# Patient Record
Sex: MALE | Race: WHITE | NOT HISPANIC OR LATINO | Employment: UNEMPLOYED | URBAN - METROPOLITAN AREA
[De-identification: names, ages, dates, MRNs, and addresses within clinical notes are randomized per-mention and may not be internally consistent; named-entity substitution may affect disease eponyms.]

---

## 2017-08-16 DIAGNOSIS — F41.8 DEPRESSION WITH ANXIETY: Primary | ICD-10-CM

## 2017-08-16 DIAGNOSIS — G40.909 EPILEPSY: ICD-10-CM

## 2017-11-24 ENCOUNTER — HOSPITAL ENCOUNTER (EMERGENCY)
Facility: HOSPITAL | Age: 52
Discharge: HOME | End: 2017-11-25
Attending: EMERGENCY MEDICINE
Payer: MEDICAID

## 2017-11-24 DIAGNOSIS — F10.920 ALCOHOLIC INTOXICATION WITHOUT COMPLICATION: Primary | ICD-10-CM

## 2017-11-24 DIAGNOSIS — F19.10 POLYSUBSTANCE ABUSE: ICD-10-CM

## 2017-11-24 LAB
ANISOCYTOSIS BLD QL SMEAR: SLIGHT
BASOPHILS # BLD AUTO: 0.05 K/UL
BASOPHILS NFR BLD: 0.6 %
BILIRUB UR QL STRIP: NEGATIVE
CLARITY UR: CLEAR
COLOR UR: YELLOW
DACRYOCYTES BLD QL SMEAR: ABNORMAL
DIFFERENTIAL METHOD: ABNORMAL
EOSINOPHIL # BLD AUTO: 0.1 K/UL
EOSINOPHIL NFR BLD: 0.8 %
ERYTHROCYTE [DISTWIDTH] IN BLOOD BY AUTOMATED COUNT: 15.6 %
GLUCOSE UR QL STRIP: NEGATIVE
HCT VFR BLD AUTO: 40.6 %
HGB BLD-MCNC: 13.4 G/DL
HGB UR QL STRIP: NEGATIVE
HYPOCHROMIA BLD QL SMEAR: ABNORMAL
KETONES UR QL STRIP: NEGATIVE
LEUKOCYTE ESTERASE UR QL STRIP: NEGATIVE
LYMPHOCYTES # BLD AUTO: 3.5 K/UL
LYMPHOCYTES NFR BLD: 44.1 %
MCH RBC QN AUTO: 22.1 PG
MCHC RBC AUTO-ENTMCNC: 33 G/DL
MCV RBC AUTO: 67 FL
MONOCYTES # BLD AUTO: 0.7 K/UL
MONOCYTES NFR BLD: 9 %
NEUTROPHILS # BLD AUTO: 3.6 K/UL
NEUTROPHILS NFR BLD: 45.5 %
NITRITE UR QL STRIP: NEGATIVE
PH UR STRIP: 6 [PH] (ref 5–8)
PLATELET # BLD AUTO: 147 K/UL
PLATELET BLD QL SMEAR: ABNORMAL
PMV BLD AUTO: 10.2 FL
POIKILOCYTOSIS BLD QL SMEAR: SLIGHT
POLYCHROMASIA BLD QL SMEAR: ABNORMAL
PROT UR QL STRIP: NEGATIVE
RBC # BLD AUTO: 6.07 M/UL
SP GR UR STRIP: <=1.005 (ref 1–1.03)
TARGETS BLD QL SMEAR: ABNORMAL
URN SPEC COLLECT METH UR: ABNORMAL
UROBILINOGEN UR STRIP-ACNC: NEGATIVE EU/DL
WBC # BLD AUTO: 7.93 K/UL

## 2017-11-24 PROCEDURE — 93010 ELECTROCARDIOGRAM REPORT: CPT | Mod: ,,, | Performed by: INTERNAL MEDICINE

## 2017-11-24 PROCEDURE — 80307 DRUG TEST PRSMV CHEM ANLYZR: CPT

## 2017-11-24 PROCEDURE — 80053 COMPREHEN METABOLIC PANEL: CPT

## 2017-11-24 PROCEDURE — 99284 EMERGENCY DEPT VISIT MOD MDM: CPT

## 2017-11-24 PROCEDURE — 93005 ELECTROCARDIOGRAM TRACING: CPT

## 2017-11-24 PROCEDURE — 81003 URINALYSIS AUTO W/O SCOPE: CPT

## 2017-11-24 PROCEDURE — 80320 DRUG SCREEN QUANTALCOHOLS: CPT

## 2017-11-24 PROCEDURE — 85025 COMPLETE CBC W/AUTO DIFF WBC: CPT

## 2017-11-25 VITALS
TEMPERATURE: 98 F | BODY MASS INDEX: 26.79 KG/M2 | WEIGHT: 220 LBS | DIASTOLIC BLOOD PRESSURE: 74 MMHG | HEIGHT: 76 IN | HEART RATE: 85 BPM | OXYGEN SATURATION: 98 % | RESPIRATION RATE: 18 BRPM | SYSTOLIC BLOOD PRESSURE: 119 MMHG

## 2017-11-25 LAB
ALBUMIN SERPL BCP-MCNC: 4.3 G/DL
ALP SERPL-CCNC: 63 U/L
ALT SERPL W/O P-5'-P-CCNC: 92 U/L
AMPHET+METHAMPHET UR QL: NEGATIVE
ANION GAP SERPL CALC-SCNC: 14 MMOL/L
AST SERPL-CCNC: 101 U/L
BARBITURATES UR QL SCN>200 NG/ML: NEGATIVE
BENZODIAZ UR QL SCN>200 NG/ML: NEGATIVE
BILIRUB SERPL-MCNC: 0.6 MG/DL
BUN SERPL-MCNC: 6 MG/DL
BZE UR QL SCN: ABNORMAL
CALCIUM SERPL-MCNC: 8.9 MG/DL
CANNABINOIDS UR QL SCN: NEGATIVE
CHLORIDE SERPL-SCNC: 107 MMOL/L
CO2 SERPL-SCNC: 23 MMOL/L
CREAT SERPL-MCNC: 0.8 MG/DL
CREAT UR-MCNC: 11.8 MG/DL
EST. GFR  (AFRICAN AMERICAN): >60 ML/MIN/1.73 M^2
EST. GFR  (NON AFRICAN AMERICAN): >60 ML/MIN/1.73 M^2
ETHANOL SERPL-MCNC: 420 MG/DL
GLUCOSE SERPL-MCNC: 96 MG/DL
METHADONE UR QL SCN>300 NG/ML: NEGATIVE
OPIATES UR QL SCN: NEGATIVE
PCP UR QL SCN>25 NG/ML: NEGATIVE
POTASSIUM SERPL-SCNC: 3.8 MMOL/L
PROT SERPL-MCNC: 7.4 G/DL
SODIUM SERPL-SCNC: 144 MMOL/L
TOXICOLOGY INFORMATION: ABNORMAL

## 2017-11-25 NOTE — ED NOTES
Pt asked why he was not allowed to go help his friends clean. Reoriented pt about why he is in the hospital and why he has pulse oximetry hooked onto his finger.

## 2017-11-25 NOTE — ED NOTES
APPEARANCE: Alert, oriented and in no acute distress. Pt slurring his words.   CARDIAC: Normal rate and rhythm. S1S2 noted.   PERIPHERAL VASCULAR: peripheral pulses present. Normal cap refill. No edema. Warm to touch.  RESPIRATORY:Normal rate and effort, breath sounds clear bilaterally throughout chest. Respirations are equal and unlabored no obvious signs of distress.  GASTRO: soft, bowel sounds normal, no tenderness, no abdominal distention.  MUSC: Full ROM. No bony tenderness or soft tissue tenderness. No obvious deformity. Pt has an unsteady gait when walking.   SKIN: Skin is warm and dry, normal skin turgor, mucous membranes moist.  NEURO: 5/5 strength major flexors/extensors bilaterally. Sensory intact to light touch bilaterally. Jose Alejandro coma scale: eyes open spontaneously-4, oriented & converses-5, obeys commands-6. No neurological abnormalities.   MENTAL STATUS: awake, alert and aware of environment.  EYE: PERRL, both eyes: pupils brisk and reactive to light. Normal size.  ENT: EARS: no obvious drainage. NOSE: no active bleeding.

## 2017-11-25 NOTE — ED NOTES
Pt no longer slurring. Was able to walk to the bathroom and back. Pt was feeling dizzy, but he was able to keep his balance, overall, but not perfectly. Pt given water and a turkey sandwich.

## 2017-11-25 NOTE — ED PROVIDER NOTES
"Encounter Date: 11/24/2017    SCRIBE #1 NOTE: I, Carl Hoffman, am scribing for, and in the presence of,  Owen Gurrola MD. I have scribed the entire note.       History     Chief Complaint   Patient presents with    Alcohol Intoxication     ems responded to 911 call at a bar for public intoxication. pt. was unable to ambulate secondary to intoxication. pt. mildly belligerent.  [id number] per ems     The patient is a 52 y.o. male presents to the ED with a complaint of alcohol intoxication. He was collected by EMS after police were called about public intoxication complaint. The patient has no specific complaints other than wanting to know who "urinated on his pants". Further history limited secondary to intoxication.       The history is provided by the EMS personnel. The history is limited by the condition of the patient.     Review of patient's allergies indicates:  No Known Allergies  History reviewed. No pertinent past medical history.  History reviewed. No pertinent surgical history.  History reviewed. No pertinent family history.  Social History   Substance Use Topics    Smoking status: Never Smoker    Smokeless tobacco: Current User     Types: Chew    Alcohol use 0.0 oz/week     Review of Systems   Unable to perform ROS: Mental status change       Physical Exam     Initial Vitals [11/24/17 2227]   BP Pulse Resp Temp SpO2   120/78 81 18 98.2 °F (36.8 °C) 97 %      MAP       92         Physical Exam    Nursing note and vitals reviewed.  Constitutional: He appears well-developed and well-nourished. No distress.   Disheveled, smells of alcohol   HENT:   Head: Normocephalic and atraumatic.   Oropharynx clear; Dry MM   Eyes: Conjunctivae and EOM are normal. Pupils are equal, round, and reactive to light.   Neck: Normal range of motion. Neck supple. No tracheal deviation present.   Cardiovascular: Normal rate, regular rhythm, normal heart sounds and intact distal pulses. Exam reveals no gallop and no friction " rub.    No murmur heard.  Pulmonary/Chest: Breath sounds normal. No respiratory distress. He has no wheezes. He has no rhonchi. He has no rales.   Abdominal: Soft. Bowel sounds are normal. He exhibits no distension. There is no tenderness. There is no rebound and no guarding.   Musculoskeletal: Normal range of motion. He exhibits no tenderness.   Neurological: He is alert. He has normal strength. No cranial nerve deficit or sensory deficit.   MAEW; Somewhat uncoordinated gait; Slurred speech   Skin: Skin is warm and dry. Capillary refill takes less than 2 seconds. No rash noted. No erythema.   Psychiatric: He has a normal mood and affect.         ED Course   Procedures  Labs Reviewed   CBC W/ AUTO DIFFERENTIAL - Abnormal; Notable for the following:        Result Value    Hemoglobin 13.4 (*)     MCV 67 (*)     MCH 22.1 (*)     RDW 15.6 (*)     Platelets 147 (*)     All other components within normal limits   COMPREHENSIVE METABOLIC PANEL - Abnormal; Notable for the following:      (*)     ALT 92 (*)     All other components within normal limits   URINALYSIS - Abnormal; Notable for the following:     Specific Gravity, UA <=1.005 (*)     All other components within normal limits   DRUG SCREEN PANEL, URINE EMERGENCY - Abnormal; Notable for the following:     Creatinine, Random Ur 11.8 (*)     All other components within normal limits   ALCOHOL,MEDICAL (ETHANOL) - Abnormal; Notable for the following:     Alcohol, Medical, Serum 420 (*)     All other components within normal limits    Narrative:      ALC  critical result(s) called and verbal readback obtained from   Alona Sosa RN, 11/25/2017 00:00     EKG Readings: (Independently Interpreted)   Initial Reading: No STEMI. Rhythm: Normal Sinus Rhythm. Heart Rate: 78. Ectopy: No Ectopy. Conduction: Normal. ST Segments: Normal ST Segments. T Waves: Normal. Axis: Right Axis Deviation.          Medical Decision Making:   Initial Assessment:   The patient is a 52  y.o. male presents to the ED with a complaint of alcohol intoxication.      Differential Diagnosis:   ICH, SAH, hepatic encephalopathy, CVA, substance abuse, electrolyte dyscrasia  Independently Interpreted Test(s):   I have ordered and independently interpreted X-rays - see prior notes.  I have ordered and independently interpreted EKG Reading(s) - see prior notes  Clinical Tests:   Lab Tests: Reviewed       <> Summary of Lab: C/W dehydration, alcohol and cocaine intoxication  ED Management:  4:35 AM Reassessment - Patient ambulated in the department without difficulty from the bathroom to his room.    5:10 AM patient is clinically sober. He is awaiting SPD arrival for transport back to his home. He was encouraged to moderate the alcohol intake and follow with his doctor.    Px was given IVF during his stay, discharged with stable VS and tolerating PO.                    ED Course      1. Alcoholic intoxication without complication    2. Polysubstance abuse          Clinical Impression:     1. Alcoholic intoxication without complication    2. Polysubstance abuse              Disposition:   Disposition: Discharged  Condition: Stable       I, Dr. Owen Gurrola, personally performed the services described in this documentation. All medical record entries made by the scribe were at my direction and in my presence.  I have reviewed the chart and agree that the record reflects my personal performance and is accurate and complete. Owen Gurrola MD.  6:52 AM 12/08/2017                     Owen Gurrola MD  12/08/17 0656

## 2017-11-25 NOTE — ED NOTES
Pt. sao2 alarming- dropping to 60's with good wave form and heart rate correlation. Pt. Noted to be having obstructive sleep apnea. Pt. arroused and sao2 slowly increased to 90's. Pt. Placed on supplemental o22l nc.

## 2017-11-25 NOTE — ED NOTES
Attempted to have pt give a contact phone number for someone that lives with him to pick him up and watch over him. Pt states he is newly , and he has been living with friends. States his friends are out of town right now.

## 2017-11-25 NOTE — ED NOTES
Pt woke up. Spoke with the pt. Pt still slurring words and speaking slowly due to decreased ability to concentrate. Pt's words are more easily understood at this time.

## 2017-11-25 NOTE — ED NOTES
Pt to ED with complaints of alcohol intoxication. Pt was at a bar when EMS was called to pick him up due to alcohol intoxication. Pt denies going to any bars today. States he works on [employer]. Satets he does not remember being picked up by EMS. Pt slurring at this time.

## 2017-11-25 NOTE — ED NOTES
Pt oriented to the situation, and pt became more compliant afterwards. Pt states he does not have to urinate yet.

## 2018-01-19 ENCOUNTER — HOSPITAL ENCOUNTER (EMERGENCY)
Facility: HOSPITAL | Age: 53
Discharge: HOME | End: 2018-01-19
Attending: EMERGENCY MEDICINE
Payer: MEDICAID

## 2018-01-19 VITALS
OXYGEN SATURATION: 98 % | TEMPERATURE: 98 F | DIASTOLIC BLOOD PRESSURE: 63 MMHG | BODY MASS INDEX: 26.79 KG/M2 | SYSTOLIC BLOOD PRESSURE: 107 MMHG | HEART RATE: 70 BPM | HEIGHT: 76 IN | RESPIRATION RATE: 18 BRPM | WEIGHT: 220 LBS

## 2018-01-19 DIAGNOSIS — R56.9 SEIZURE: ICD-10-CM

## 2018-01-19 LAB
ALBUMIN SERPL BCP-MCNC: 3.9 G/DL
ALP SERPL-CCNC: 60 U/L
ALT SERPL W/O P-5'-P-CCNC: 21 U/L
ANION GAP SERPL CALC-SCNC: 4 MMOL/L
AST SERPL-CCNC: 19 U/L
BASOPHILS # BLD AUTO: 0.04 K/UL
BASOPHILS NFR BLD: 0.6 %
BILIRUB SERPL-MCNC: 0.4 MG/DL
BILIRUB UR QL STRIP: NEGATIVE
BUN SERPL-MCNC: 11 MG/DL
CALCIUM SERPL-MCNC: 9 MG/DL
CHLORIDE SERPL-SCNC: 105 MMOL/L
CLARITY UR: CLEAR
CO2 SERPL-SCNC: 31 MMOL/L
COLOR UR: NORMAL
CREAT SERPL-MCNC: 0.8 MG/DL
DIFFERENTIAL METHOD: ABNORMAL
EOSINOPHIL # BLD AUTO: 0.1 K/UL
EOSINOPHIL NFR BLD: 0.8 %
ERYTHROCYTE [DISTWIDTH] IN BLOOD BY AUTOMATED COUNT: 16.3 %
EST. GFR  (AFRICAN AMERICAN): >60 ML/MIN/1.73 M^2
EST. GFR  (NON AFRICAN AMERICAN): >60 ML/MIN/1.73 M^2
GLUCOSE SERPL-MCNC: 95 MG/DL
GLUCOSE UR QL STRIP: NEGATIVE
HCT VFR BLD AUTO: 31.2 %
HGB BLD-MCNC: 10.2 G/DL
HGB UR QL STRIP: NEGATIVE
KETONES UR QL STRIP: NEGATIVE
LEUKOCYTE ESTERASE UR QL STRIP: NEGATIVE
LYMPHOCYTES # BLD AUTO: 2 K/UL
LYMPHOCYTES NFR BLD: 31.5 %
MCH RBC QN AUTO: 21.4 PG
MCHC RBC AUTO-ENTMCNC: 32.7 G/DL
MCV RBC AUTO: 65 FL
MONOCYTES # BLD AUTO: 0.7 K/UL
MONOCYTES NFR BLD: 11.1 %
NEUTROPHILS # BLD AUTO: 3.5 K/UL
NEUTROPHILS NFR BLD: 56 %
NITRITE UR QL STRIP: NEGATIVE
PH UR STRIP: 8 [PH] (ref 5–8)
PHENYTOIN SERPL-MCNC: 1 UG/ML
PLATELET # BLD AUTO: 226 K/UL
PMV BLD AUTO: 11.1 FL
POCT GLUCOSE: 103 MG/DL (ref 70–110)
POTASSIUM SERPL-SCNC: 4.2 MMOL/L
PROT SERPL-MCNC: 6.2 G/DL
PROT UR QL STRIP: NEGATIVE
RBC # BLD AUTO: 4.77 M/UL
SODIUM SERPL-SCNC: 140 MMOL/L
SP GR UR STRIP: 1.01 (ref 1–1.03)
TROPONIN I SERPL DL<=0.01 NG/ML-MCNC: 0.01 NG/ML
URN SPEC COLLECT METH UR: NORMAL
UROBILINOGEN UR STRIP-ACNC: NEGATIVE EU/DL
WBC # BLD AUTO: 6.29 K/UL

## 2018-01-19 PROCEDURE — 93010 ELECTROCARDIOGRAM REPORT: CPT | Mod: ,,, | Performed by: INTERNAL MEDICINE

## 2018-01-19 PROCEDURE — 63600175 PHARM REV CODE 636 W HCPCS: Mod: JG | Performed by: EMERGENCY MEDICINE

## 2018-01-19 PROCEDURE — 93005 ELECTROCARDIOGRAM TRACING: CPT

## 2018-01-19 PROCEDURE — 25000003 PHARM REV CODE 250: Performed by: EMERGENCY MEDICINE

## 2018-01-19 PROCEDURE — 80185 ASSAY OF PHENYTOIN TOTAL: CPT

## 2018-01-19 PROCEDURE — 82962 GLUCOSE BLOOD TEST: CPT

## 2018-01-19 PROCEDURE — 80053 COMPREHEN METABOLIC PANEL: CPT

## 2018-01-19 PROCEDURE — 96374 THER/PROPH/DIAG INJ IV PUSH: CPT

## 2018-01-19 PROCEDURE — 84484 ASSAY OF TROPONIN QUANT: CPT

## 2018-01-19 PROCEDURE — 81003 URINALYSIS AUTO W/O SCOPE: CPT

## 2018-01-19 PROCEDURE — 85025 COMPLETE CBC W/AUTO DIFF WBC: CPT

## 2018-01-19 PROCEDURE — 99284 EMERGENCY DEPT VISIT MOD MDM: CPT | Mod: 25

## 2018-01-19 RX ORDER — GABAPENTIN 300 MG/1
300 CAPSULE ORAL 3 TIMES DAILY
COMMUNITY
End: 2018-01-19

## 2018-01-19 RX ORDER — PAROXETINE HYDROCHLORIDE HEMIHYDRATE 25 MG/1
50 TABLET, FILM COATED, EXTENDED RELEASE ORAL DAILY
COMMUNITY

## 2018-01-19 RX ORDER — PHENYTOIN SODIUM 100 MG/1
200 CAPSULE, EXTENDED RELEASE ORAL 2 TIMES DAILY
COMMUNITY
End: 2018-01-19

## 2018-01-19 RX ORDER — PHENYTOIN SODIUM 100 MG/1
200 CAPSULE, EXTENDED RELEASE ORAL 2 TIMES DAILY
Qty: 28 CAPSULE | Refills: 0 | Status: SHIPPED | OUTPATIENT
Start: 2018-01-19

## 2018-01-19 RX ORDER — ARIPIPRAZOLE 5 MG/1
5 TABLET ORAL DAILY
COMMUNITY

## 2018-01-19 RX ORDER — GABAPENTIN 300 MG/1
300 CAPSULE ORAL 3 TIMES DAILY
Qty: 42 CAPSULE | Refills: 0 | Status: SHIPPED | OUTPATIENT
Start: 2018-01-19

## 2018-01-19 RX ADMIN — FOSPHENYTOIN SODIUM 1000 MG PE: 50 INJECTION, SOLUTION INTRAMUSCULAR; INTRAVENOUS at 08:01

## 2018-01-19 NOTE — ED NOTES
Pt unsure of daily meds. Reports medications filled at [facility]. [facility] called to obtain updated list of medications.

## 2018-01-19 NOTE — ED TRIAGE NOTES
"Pt to the ED via EMS with c/o absent seizures. Pt was at a meeting and members reported "blank stare". Pt unable to recall episode. No acute distress noted. Pt placed on cardiac monitor. Pt unsure of medications.   "

## 2018-01-20 NOTE — DISCHARGE INSTRUCTIONS
You were seen in the emergency department after a staring spell.  It seems you may have had a absence seizures which is a type of seizure that can involve staring spells and having no recollection of a period of time.  We gave you a loading dose of your anti-seizure medication.  We have written a prescription for 2 weeks of your gabapentin and your seizure medication.  Please follow-up with a primary care provider.  You can call the [facility] and schedule appointment or even walk in this week.  Please discuss your home medications as well as her lab results.  Your red blood levels are somewhat low.  Please this test this with the [facility] provider.

## 2018-01-20 NOTE — ED NOTES
Spoke with Keysha Menjivar at [facility] per patients permission. Informed pt has been discharged from ED. States she will call a cab for pt to be picked up.

## 2018-01-20 NOTE — ED PROVIDER NOTES
Encounter Date: 1/19/2018    SCRIBE #1 NOTE: I, Spencer Parish II, am scribing for, and in the presence of,  Roberto Christine MD. I have scribed the following portions of the note - Other sections scribed: HPI, ROS, PE.       History     Chief Complaint   Patient presents with    Seizures     Pt reports possible seizure like activty today at group meeting. Reports being out of medications for 5 days.      CC: Seizures      HPI: This 52 y.o. male with seizures, esophageal and gastric varices, encounter for blood transfusion, and hepatitis C presents to the ED c/o acute onset, seizures with headache and dizziness. Pt reports he was at an AA meeting when he suddenly woke up and was unaware of his surroundings. He states he was unable to recall the day, time, surrounding, or recent activities. He reports the meeting lasted roughly an hour. Pt reports he recently ran out of his Phenytoin medication for the past 5 days. Pt reports he has an ENT appointment scheduled 1/23. No alleviating or exacerbating factors. Pt denies nausea, diaphoresis, and SOB.       The history is provided by the patient. No  was used.     Review of patient's allergies indicates:  No Known Allergies  Past Medical History:   Diagnosis Date    Encounter for blood transfusion     Esophageal and gastric varices     Hepatitis C     Seizures      Past Surgical History:   Procedure Laterality Date    APPENDECTOMY      BACK SURGERY      L4 L5    TONSILLECTOMY      TRACHEOSTOMY CLOSURE      TRACHEOSTOMY TUBE PLACEMENT       History reviewed. No pertinent family history.  Social History   Substance Use Topics    Smoking status: Never Smoker    Smokeless tobacco: Current User     Types: Chew    Alcohol use No      Comment: alcohol abuse, 60 days sober     Review of Systems   Constitutional: Negative for chills, diaphoresis and fever.   HENT: Negative for ear pain and sore throat.    Eyes:        (-) eye problems   Respiratory:  Negative for cough and shortness of breath.    Cardiovascular: Negative for chest pain.   Gastrointestinal: Negative for abdominal pain, diarrhea, nausea and vomiting.   Genitourinary: Negative for dysuria.   Musculoskeletal: Negative for back pain.        (-) arm or leg pain   Skin: Negative for rash.   Neurological: Positive for dizziness, seizures and headaches.       Physical Exam     Initial Vitals [01/19/18 1545]   BP Pulse Resp Temp SpO2   (!) 142/78 82 18 98 °F (36.7 °C) 98 %      MAP       99.33         Physical Exam    Nursing note and vitals reviewed.  Constitutional: He appears well-developed and well-nourished. No distress.   HENT:   Head: Normocephalic and atraumatic.   Eyes: Conjunctivae and EOM are normal. Pupils are equal, round, and reactive to light.   Neck: Normal range of motion. Neck supple.   Cardiovascular: Normal rate and normal heart sounds.   Pulmonary/Chest: Effort normal and breath sounds normal.   Abdominal: Soft. Normal appearance and bowel sounds are normal. There is no tenderness.   Genitourinary:   Genitourinary Comments: Heme negative brown stool   Musculoskeletal: Normal range of motion.   Neurological: He is alert and oriented to person, place, and time. He has normal strength. No cranial nerve deficit or sensory deficit.   Skin: Skin is warm and dry.   Psychiatric: He has a normal mood and affect. His behavior is normal. Thought content normal.         ED Course   Procedures  Labs Reviewed   CBC W/ AUTO DIFFERENTIAL - Abnormal; Notable for the following:        Result Value    Hemoglobin 10.2 (*)     Hematocrit 31.2 (*)     MCV 65 (*)     MCH 21.4 (*)     RDW 16.3 (*)     All other components within normal limits   COMPREHENSIVE METABOLIC PANEL - Abnormal; Notable for the following:     CO2 31 (*)     Anion Gap 4 (*)     All other components within normal limits   PHENYTOIN LEVEL, TOTAL - Abnormal; Notable for the following:     Phenytoin Lvl 1.0 (*)     All other components  within normal limits   URINALYSIS   TROPONIN I   POCT GLUCOSE   POCT GLUCOSE MONITORING CONTINUOUS             Medical Decision Making:   Initial Assessment:   52-year-old male with a history of seizures presenting after a staring spell earlier today.  Patient has been out of his phenytoin ×5 days.  Physical and thorough neuro exam normal.  Labs normal with the exception of a mild anemia.  Denies black or bloody stool.  Could be iron deficiency anemia given low MCV.  Patient was phosphenytoin loaded.  Given prescriptions for his home seizure medications which she has been out of.  I discussed strict return precautions as well as the need to follow-up the [facility] for his anemia as well as long-term prescription for his seizure medication.  While the patient was here, he developed a mild headache, gradual onset, similar to prior headaches and not severe.  Patient was given Tylenol which improved his headache.  I have low concern that the patient has an acute infection, ICH, tumor.  Vital signs normal.  Low concern for alcohol withdrawal seizure.  Patient was discharged after strict return precautions given.            Scribe Attestation:   Scribe #1: I performed the above scribed service and the documentation accurately describes the services I performed. I attest to the accuracy of the note.    Attending Attestation:           Physician Attestation for Scribe:  Physician Attestation Statement for Scribe #1: I, Roberto Christine MD, reviewed documentation, as scribed by Spencer Lugo II in my presence, and it is both accurate and complete.                 ED Course      Clinical Impression:   The encounter diagnosis was Seizure.                           Roberto Christine MD  01/19/18 8371       Roberto Christine MD  01/19/18 5073

## 2018-01-24 ENCOUNTER — CLINICAL SUPPORT (OUTPATIENT)
Dept: OCCUPATIONAL MEDICINE | Facility: CLINIC | Age: 53
End: 2018-01-24

## 2018-01-24 DIAGNOSIS — Z11.1 PPD SCREENING TEST: Primary | ICD-10-CM

## 2018-01-24 PROCEDURE — 86580 TB INTRADERMAL TEST: CPT | Mod: S$GLB,,,

## 2018-01-26 LAB
TB INDURATION - 48 HR READ: 0 MM
TB INDURATION - 72 HR READ: NORMAL MM
TB SKIN TEST - 48 HR READ: NEGATIVE
TB SKIN TEST - 72 HR READ: NORMAL

## 2018-09-29 ENCOUNTER — OFFICE VISIT (OUTPATIENT)
Dept: URGENT CARE | Facility: CLINIC | Age: 53
End: 2018-09-29
Payer: MEDICAID

## 2018-09-29 VITALS
OXYGEN SATURATION: 97 % | TEMPERATURE: 98 F | BODY MASS INDEX: 26.79 KG/M2 | HEIGHT: 76 IN | SYSTOLIC BLOOD PRESSURE: 135 MMHG | WEIGHT: 220 LBS | HEART RATE: 96 BPM | DIASTOLIC BLOOD PRESSURE: 80 MMHG

## 2018-09-29 DIAGNOSIS — R19.7 DIARRHEA, UNSPECIFIED TYPE: Primary | ICD-10-CM

## 2018-09-29 PROCEDURE — 99203 OFFICE O/P NEW LOW 30 MIN: CPT | Mod: S$GLB,,, | Performed by: NURSE PRACTITIONER

## 2018-09-29 RX ORDER — DIPHENOXYLATE HYDROCHLORIDE AND ATROPINE SULFATE 2.5; .025 MG/1; MG/1
1 TABLET ORAL 4 TIMES DAILY PRN
Qty: 20 TABLET | Refills: 0 | Status: SHIPPED | OUTPATIENT
Start: 2018-09-29 | End: 2019-09-29

## 2018-09-29 NOTE — PATIENT INSTRUCTIONS
IF YOUR DIARRHEA IS PERSISTENT YOU MUST FOLLOW UP WITH YOUR PRIMARY CARE TO HAVE STOOL STUDIES.    AVOID SPICY FOODS.      You must understand that you've received an Urgent Care treatment only and that you may be released before all your medical problems are known or treated. You, the patient, will arrange for follow up care as instructed.  If your condition worsens we recommend that you receive another evaluation at the emergency room immediately or contact your primary medical clinics after hours call service to discuss your concerns.  Please return here or go to the Emergency Department for any concerns or worsening of condition.      Uncertain Causes of Diarrhea (Adult)    Diarrhea is when stools are loose and watery. This can be caused by:  · Viral infections  · Bacterial infections  · Food poisoning  · Parasites  · Irritable bowel syndrome (IBS)  · Inflammatory bowel diseases such as ulcerative colitis, Crohn's disease, and celiac disease  · Food intolerance, such as to lactose, the sugar found in milk and milk products  · Reaction to medicines like antibiotics, laxatives, cancer drugs, and antacids  Along with diarrhea, you may also have:  · Abdominal pain and cramping  · Nausea and vomiting  · Loss of bowel control  · Fever and chills  · Bloody stools  In some cases, antibiotics may help to treat diarrhea. You may have a stool sample test. This is done to see what is causing your diarrhea, and if antibiotics will help treat it. The results of a stool sample test may take up to 2 days. The healthcare provider may not give you antibiotics until he or she has the stool test results.  Diarrhea can cause dehydration. This is the loss of too much water and other fluids from the body. When this occurs, body fluid must be replaced. This can be done with oral rehydration solutions. Oral rehydration solutions are available at drugstores and grocery stores without a prescription.  Home care  Follow all instructions  given by your healthcare provider. Rest at home for the next 24 hours, or until you feel better. Avoid caffeine, tobacco, and alcohol. These can make diarrhea, cramping, and pain worse.  If taking medicines:  · Dont take over-the-counter diarrhea or nausea medicines unless your healthcare provider tells you to.  · You may use acetaminophen or NSAID medicines like ibuprofen or naproxen to reduce pain and fever. Dont use these if you have chronic liver or kidney disease, or ever had a stomach ulcer or gastrointestinal bleeding. Don't use NSAID medicines if you are already taking one for another condition (like arthritis) or are on daily aspirin therapy (such as for heart disease or after a stroke). Talk with your healthcare provider first.  · If antibiotics were prescribed, be sure you take them until they are finished. Dont stop taking them even when you feel better. Antibiotics must be taken as a full course.  To prevent the spread of illness:  · Remember that washing with soap and water and using alcohol-based  is the best way to prevent the spread of infection.  · Clean the toilet after each use.  · Wash your hands before eating.  · Wash your hands before and after preparing food. Keep in mind that people with diarrhea or vomiting should not prepare food for others.  · Wash your hands after using cutting boards, countertops, and knives that have been in contact with raw foods.  · Wash and then peel fruits and vegetables.  · Keep uncooked meats away from cooked and ready-to-eat foods.  · Use a food thermometer when cooking. Cook poultry to at least 165°F (74°C). Cook ground meat (beef, veal, pork, lamb) to at least 160°F (71°C). Cook fresh beef, veal, lamb, and pork to at least 145°F (63°C).  · Dont eat raw or undercooked eggs (poached or natalie side up), poultry, meat, or unpasteurized milk and juices.  Food and drinks  The main goal while treating vomiting or diarrhea is to prevent dehydration. This  is done by taking small amounts of liquids often.  · Keep in mind that liquids are more important than food right now.  · Drink only small amounts of liquids at a time.  · Dont force yourself to eat, especially if you are having cramping, vomiting, or diarrhea. Dont eat large amounts at a time, even if you are hungry.  · If you eat, avoid fatty, greasy, spicy, or fried foods.  · Dont eat dairy foods or drink milk if you have diarrhea. These can make diarrhea worse.  During the first 24 hours you can try:  · Oral rehydration solutions. Do not use sports drinks. They have too much sugar and not enough electrolytes.  · Soft drinks without caffeine  · Ginger ale  · Water (plain or flavored)  · Decaf tea or coffee  · Clear broth, consommé, or bouillon  · Gelatin, popsicles, or frozen fruit juice bars  The second 24 hours, if you are feeling better, you can add:  · Hot cereal, plain toast, bread, rolls, or crackers  · Plain noodles, rice, mashed potatoes, chicken noodle soup, or rice soup  · Unsweetened canned fruit (no pineapple)  · Bananas  As you recover:  · Limit fat intake to less than 15 grams per day. Dont eat margarine, butter, oils, mayonnaise, sauces, gravies, fried foods, peanut butter, meat, poultry, or fish.  · Limit fiber. Dont eat raw or cooked vegetables, fresh fruits except bananas, or bran cereals.  · Limit caffeine and chocolate.  · Limit dairy.  · Dont use spices or seasonings except salt.  · Go back to your normal diet over time, as you feel better and your symptoms improve.  · If the symptoms come back, go back to a simple diet or clear liquids.  Follow-up care  Follow up with your healthcare provider, or as advised. If a stool sample was taken or cultures were done, call the healthcare provider for the results as instructed.  Call 911  Call 911 if you have any of these symptoms:  · Trouble breathing  · Confusion  · Extreme drowsiness or trouble walking  · Loss of consciousness  · Rapid heart  rate  · Chest pain  · Stiff neck  · Seizure  When to seek medical advice  Call your healthcare provider right away if any of these occur:  · Abdominal pain that gets worse  · Constant lower right abdominal pain  · Continued vomiting and inability to keep liquids down  · Diarrhea more than 5 times a day  · Blood in vomit or stool  · Dark urine or no urine for 8 hours, dry mouth and tongue, tiredness, weakness, or dizziness  · Drowsiness  · New rash  · You dont get better in 2 to 3 days  · Fever of 100.4°F (38°C) or higher that doesnt get lower with medicine  Date Last Reviewed: 1/3/2016  © 8776-4214 [employer]. [address] [place] [address], [place] [address]. All rights reserved. This information is not intended as a substitute for professional medical care. Always follow your healthcare professional's instructions.

## 2018-09-29 NOTE — PROGRESS NOTES
"Subjective:       Patient ID: Elier Miller is a 53 y.o. male.    Vitals:  height is 6' 4" (1.93 m) and weight is 99.8 kg (220 lb). His temperature is 97.6 °F (36.4 °C). His blood pressure is 135/80 and his pulse is 96. His oxygen saturation is 97%.     Chief Complaint: Diarrhea    Patient states that his symptoms started a week ago and on Thursday he thought it had subsided and today it started again. Denies any blood or mucus in stool. No recent travel. He states he took his girlfriends antibiotics for one day to see if it would go away.       Diarrhea    This is a new problem. The current episode started in the past 7 days. The problem occurs 5 to 10 times per day. The problem has been gradually worsening. The stool consistency is described as watery. Associated symptoms include chills, a fever and sweats. Pertinent negatives include no abdominal pain, headaches or vomiting. Nothing aggravates the symptoms. There are no known risk factors. He has tried increased fluids and bismuth subsalicylate for the symptoms. The treatment provided no relief.     Review of Systems   Constitution: Positive for chills, decreased appetite and fever.   HENT: Negative for sore throat.    Eyes: Negative for blurred vision.   Cardiovascular: Negative for chest pain.   Respiratory: Negative for shortness of breath.    Skin: Negative for rash.   Musculoskeletal: Negative for back pain and joint pain.   Gastrointestinal: Positive for diarrhea and heartburn. Negative for abdominal pain, nausea and vomiting.   Neurological: Negative for headaches.   Psychiatric/Behavioral: The patient is not nervous/anxious.    All other systems reviewed and are negative.      Objective:      Physical Exam   Constitutional: He is oriented to person, place, and time. He appears well-developed and well-nourished.   HENT:   Head: Normocephalic and atraumatic.   Right Ear: External ear normal.   Left Ear: External ear normal.   Nose: Nose normal. "   Mouth/Throat: Mucous membranes are normal.   Eyes: Conjunctivae and lids are normal.   Neck: Trachea normal and full passive range of motion without pain. Neck supple.   Cardiovascular: Normal rate, regular rhythm and normal heart sounds.   Pulmonary/Chest: Effort normal and breath sounds normal. No respiratory distress.   Abdominal: Soft. Normal appearance and bowel sounds are normal. He exhibits no distension, no abdominal bruit, no pulsatile midline mass and no mass. There is no tenderness.   Musculoskeletal: Normal range of motion. He exhibits no edema.   Neurological: He is alert and oriented to person, place, and time. He has normal strength.   Skin: Skin is warm, dry and intact. He is not diaphoretic. No pallor.   Psychiatric: He has a normal mood and affect. His speech is normal and behavior is normal. Judgment and thought content normal. Cognition and memory are normal.   Nursing note and vitals reviewed.      Assessment:       1. Diarrhea, unspecified type        Plan:         Case discussed with Dr. Pablo and agrees with plan of care and treatment. Discussed viral versus bacterial causes. If persistent patient understands that he is going to need stool studies.    Diarrhea, unspecified type    Other orders  -     diphenoxylate-atropine 2.5-0.025 mg (LOMOTIL) 2.5-0.025 mg per tablet; Take 1 tablet by mouth 4 (four) times daily as needed for Diarrhea.  Dispense: 20 tablet; Refill: 0            Patient Instructions   IF YOUR DIARRHEA IS PERSISTENT YOU MUST FOLLOW UP WITH YOUR PRIMARY CARE TO HAVE STOOL STUDIES.    AVOID SPICY FOODS.      You must understand that you've received an Urgent Care treatment only and that you may be released before all your medical problems are known or treated. You, the patient, will arrange for follow up care as instructed.  If your condition worsens we recommend that you receive another evaluation at the emergency room immediately or contact your primary medical clinics  after hours call service to discuss your concerns.  Please return here or go to the Emergency Department for any concerns or worsening of condition.      Uncertain Causes of Diarrhea (Adult)    Diarrhea is when stools are loose and watery. This can be caused by:  · Viral infections  · Bacterial infections  · Food poisoning  · Parasites  · Irritable bowel syndrome (IBS)  · Inflammatory bowel diseases such as ulcerative colitis, Crohn's disease, and celiac disease  · Food intolerance, such as to lactose, the sugar found in milk and milk products  · Reaction to medicines like antibiotics, laxatives, cancer drugs, and antacids  Along with diarrhea, you may also have:  · Abdominal pain and cramping  · Nausea and vomiting  · Loss of bowel control  · Fever and chills  · Bloody stools  In some cases, antibiotics may help to treat diarrhea. You may have a stool sample test. This is done to see what is causing your diarrhea, and if antibiotics will help treat it. The results of a stool sample test may take up to 2 days. The healthcare provider may not give you antibiotics until he or she has the stool test results.  Diarrhea can cause dehydration. This is the loss of too much water and other fluids from the body. When this occurs, body fluid must be replaced. This can be done with oral rehydration solutions. Oral rehydration solutions are available at drugstores and grocery stores without a prescription.  Home care  Follow all instructions given by your healthcare provider. Rest at home for the next 24 hours, or until you feel better. Avoid caffeine, tobacco, and alcohol. These can make diarrhea, cramping, and pain worse.  If taking medicines:  · Dont take over-the-counter diarrhea or nausea medicines unless your healthcare provider tells you to.  · You may use acetaminophen or NSAID medicines like ibuprofen or naproxen to reduce pain and fever. Dont use these if you have chronic liver or kidney disease, or ever had a  stomach ulcer or gastrointestinal bleeding. Don't use NSAID medicines if you are already taking one for another condition (like arthritis) or are on daily aspirin therapy (such as for heart disease or after a stroke). Talk with your healthcare provider first.  · If antibiotics were prescribed, be sure you take them until they are finished. Dont stop taking them even when you feel better. Antibiotics must be taken as a full course.  To prevent the spread of illness:  · Remember that washing with soap and water and using alcohol-based  is the best way to prevent the spread of infection.  · Clean the toilet after each use.  · Wash your hands before eating.  · Wash your hands before and after preparing food. Keep in mind that people with diarrhea or vomiting should not prepare food for others.  · Wash your hands after using cutting boards, countertops, and knives that have been in contact with raw foods.  · Wash and then peel fruits and vegetables.  · Keep uncooked meats away from cooked and ready-to-eat foods.  · Use a food thermometer when cooking. Cook poultry to at least 165°F (74°C). Cook ground meat (beef, veal, pork, lamb) to at least 160°F (71°C). Cook fresh beef, veal, lamb, and pork to at least 145°F (63°C).  · Dont eat raw or undercooked eggs (poached or natalie side up), poultry, meat, or unpasteurized milk and juices.  Food and drinks  The main goal while treating vomiting or diarrhea is to prevent dehydration. This is done by taking small amounts of liquids often.  · Keep in mind that liquids are more important than food right now.  · Drink only small amounts of liquids at a time.  · Dont force yourself to eat, especially if you are having cramping, vomiting, or diarrhea. Dont eat large amounts at a time, even if you are hungry.  · If you eat, avoid fatty, greasy, spicy, or fried foods.  · Dont eat dairy foods or drink milk if you have diarrhea. These can make diarrhea worse.  During the first  24 hours you can try:  · Oral rehydration solutions. Do not use sports drinks. They have too much sugar and not enough electrolytes.  · Soft drinks without caffeine  · Ginger ale  · Water (plain or flavored)  · Decaf tea or coffee  · Clear broth, consommé, or bouillon  · Gelatin, popsicles, or frozen fruit juice bars  The second 24 hours, if you are feeling better, you can add:  · Hot cereal, plain toast, bread, rolls, or crackers  · Plain noodles, rice, mashed potatoes, chicken noodle soup, or rice soup  · Unsweetened canned fruit (no pineapple)  · Bananas  As you recover:  · Limit fat intake to less than 15 grams per day. Dont eat margarine, butter, oils, mayonnaise, sauces, gravies, fried foods, peanut butter, meat, poultry, or fish.  · Limit fiber. Dont eat raw or cooked vegetables, fresh fruits except bananas, or bran cereals.  · Limit caffeine and chocolate.  · Limit dairy.  · Dont use spices or seasonings except salt.  · Go back to your normal diet over time, as you feel better and your symptoms improve.  · If the symptoms come back, go back to a simple diet or clear liquids.  Follow-up care  Follow up with your healthcare provider, or as advised. If a stool sample was taken or cultures were done, call the healthcare provider for the results as instructed.  Call 911  Call 911 if you have any of these symptoms:  · Trouble breathing  · Confusion  · Extreme drowsiness or trouble walking  · Loss of consciousness  · Rapid heart rate  · Chest pain  · Stiff neck  · Seizure  When to seek medical advice  Call your healthcare provider right away if any of these occur:  · Abdominal pain that gets worse  · Constant lower right abdominal pain  · Continued vomiting and inability to keep liquids down  · Diarrhea more than 5 times a day  · Blood in vomit or stool  · Dark urine or no urine for 8 hours, dry mouth and tongue, tiredness, weakness, or dizziness  · Drowsiness  · New rash  · You dont get better in 2 to 3  days  · Fever of 100.4°F (38°C) or higher that doesnt get lower with medicine  Date Last Reviewed: 1/3/2016  © 4964-0213 [employer]. [address] [place] [address], [place] [address]. All rights reserved. This information is not intended as a substitute for professional medical care. Always follow your healthcare professional's instructions.